# Patient Record
(demographics unavailable — no encounter records)

---

## 2024-10-22 NOTE — REASON FOR VISIT
[Home] : at home, [unfilled] , at the time of the visit. [Medical Office: ([facility])___] : at the medical office located in  [Patient] : the patient [Follow-Up: _____] : a [unfilled] follow-up visit

## 2025-01-02 NOTE — HISTORY OF PRESENT ILLNESS
[FreeTextEntry1] : DOS 12/23/24 FFS forehead reduction, bicoronal cpt 74657 supraorbital reduction cpt 03169 lateral orbital reconstruction 66232 frontal sinus modification cpt 39799 fat grafting to zygoma, staged procedure - cpt 70347 genioplasty, reductive cpt 09512, advance 5mm  Patient admitted to [facility] x 1 day postoperatively.  Postoperative course was unremarkable  No excessive bleeding. No fevers. No odor. No purulent discharge. No excessive pain.

## 2025-01-09 NOTE — HISTORY OF PRESENT ILLNESS
[FreeTextEntry1] : DOS 12/23/24 - PROCEDURES:  Facial feminization with: Forehead reduction hairline advancement. 15824  Bilateral supraorbital and lateral orbital rim osteotomy and temporal bone reduction.02291-48  Frontal sinus setback modification. 21139  Fat grafting to the cheeks. 15773 5. Osseous genioplasty. 21122